# Patient Record
Sex: FEMALE | Race: WHITE
[De-identification: names, ages, dates, MRNs, and addresses within clinical notes are randomized per-mention and may not be internally consistent; named-entity substitution may affect disease eponyms.]

---

## 2019-07-24 ENCOUNTER — HOSPITAL ENCOUNTER (OUTPATIENT)
Dept: HOSPITAL 92 - SCSCT | Age: 49
Discharge: HOME | End: 2019-07-24
Attending: PODIATRIST
Payer: COMMERCIAL

## 2019-07-24 DIAGNOSIS — M20.22: Primary | ICD-10-CM

## 2019-07-24 DIAGNOSIS — Z98.1: ICD-10-CM

## 2019-07-24 DIAGNOSIS — M21.6X2: ICD-10-CM

## 2019-07-24 DIAGNOSIS — Z89.412: ICD-10-CM

## 2019-07-24 DIAGNOSIS — M25.572: ICD-10-CM
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
